# Patient Record
Sex: MALE | Race: ASIAN | NOT HISPANIC OR LATINO | URBAN - METROPOLITAN AREA
[De-identification: names, ages, dates, MRNs, and addresses within clinical notes are randomized per-mention and may not be internally consistent; named-entity substitution may affect disease eponyms.]

---

## 2022-03-05 ENCOUNTER — EMERGENCY (EMERGENCY)
Facility: HOSPITAL | Age: 22
LOS: 1 days | Discharge: HOME | End: 2022-03-05
Admitting: EMERGENCY MEDICINE
Payer: COMMERCIAL

## 2022-03-05 VITALS
OXYGEN SATURATION: 98 % | DIASTOLIC BLOOD PRESSURE: 71 MMHG | RESPIRATION RATE: 16 BRPM | SYSTOLIC BLOOD PRESSURE: 112 MMHG | HEART RATE: 85 BPM | TEMPERATURE: 99 F

## 2022-03-05 VITALS
TEMPERATURE: 98 F | HEIGHT: 71 IN | DIASTOLIC BLOOD PRESSURE: 83 MMHG | WEIGHT: 160.06 LBS | OXYGEN SATURATION: 99 % | RESPIRATION RATE: 17 BRPM | SYSTOLIC BLOOD PRESSURE: 143 MMHG | HEART RATE: 122 BPM

## 2022-03-05 PROCEDURE — 99284 EMERGENCY DEPT VISIT MOD MDM: CPT

## 2022-03-05 RX ORDER — CETIRIZINE HYDROCHLORIDE 10 MG/1
1 TABLET ORAL
Qty: 7 | Refills: 0
Start: 2022-03-05 | End: 2022-03-11

## 2022-03-05 RX ORDER — EPINEPHRINE 0.3 MG/.3ML
0.3 INJECTION INTRAMUSCULAR; SUBCUTANEOUS ONCE
Refills: 0 | Status: COMPLETED | OUTPATIENT
Start: 2022-03-05 | End: 2022-03-05

## 2022-03-05 RX ORDER — IPRATROPIUM/ALBUTEROL SULFATE 18-103MCG
3 AEROSOL WITH ADAPTER (GRAM) INHALATION ONCE
Refills: 0 | Status: COMPLETED | OUTPATIENT
Start: 2022-03-05 | End: 2022-03-05

## 2022-03-05 RX ORDER — FAMOTIDINE 10 MG/ML
20 INJECTION INTRAVENOUS ONCE
Refills: 0 | Status: COMPLETED | OUTPATIENT
Start: 2022-03-05 | End: 2022-03-05

## 2022-03-05 RX ORDER — SODIUM CHLORIDE 9 MG/ML
1000 INJECTION INTRAMUSCULAR; INTRAVENOUS; SUBCUTANEOUS ONCE
Refills: 0 | Status: COMPLETED | OUTPATIENT
Start: 2022-03-05 | End: 2022-03-05

## 2022-03-05 RX ORDER — FAMOTIDINE 10 MG/ML
1 INJECTION INTRAVENOUS
Qty: 8 | Refills: 0
Start: 2022-03-05 | End: 2022-03-08

## 2022-03-05 RX ORDER — EPINEPHRINE 0.3 MG/.3ML
0.3 INJECTION INTRAMUSCULAR; SUBCUTANEOUS
Qty: 1 | Refills: 0
Start: 2022-03-05

## 2022-03-05 RX ADMIN — EPINEPHRINE 0.3 MILLIGRAM(S): 0.3 INJECTION INTRAMUSCULAR; SUBCUTANEOUS at 22:08

## 2022-03-05 RX ADMIN — FAMOTIDINE 20 MILLIGRAM(S): 10 INJECTION INTRAVENOUS at 18:25

## 2022-03-05 RX ADMIN — SODIUM CHLORIDE 1000 MILLILITER(S): 9 INJECTION INTRAMUSCULAR; INTRAVENOUS; SUBCUTANEOUS at 18:20

## 2022-03-05 RX ADMIN — Medication 3 MILLILITER(S): at 18:20

## 2022-03-05 RX ADMIN — Medication 125 MILLIGRAM(S): at 18:19

## 2022-03-05 RX ADMIN — EPINEPHRINE 0.3 MILLIGRAM(S): 0.3 INJECTION INTRAMUSCULAR; SUBCUTANEOUS at 18:25

## 2022-03-05 NOTE — ED PROVIDER NOTE - PROGRESS NOTE DETAILS
patient continues to feel well. notes rash is improved and patient has no more shortness of breath. will continue to monitor closely. patient placed directly in front of provider station. rechecked patient. mentating normally. reports symptomatic improvement. rash to forearm decreased. facial erythema mildly improved.

## 2022-03-05 NOTE — ED PROVIDER NOTE - PATIENT PORTAL LINK FT
You can access the FollowMyHealth Patient Portal offered by [facility] by registering at the following website: [email or web address]. By joining [employer]’s FollowMyHealth portal, you will also be able to view your health information using other applications (apps) compatible with our system.

## 2022-03-05 NOTE — ED PROVIDER NOTE - ENMT, MLM
+Mild swelling of the lips. No swelling of the tongue, no hard or soft palette. +Mild swelling of the lips. hives to the face. No swelling of the tongue, no hard or soft palette abnormalities

## 2022-03-05 NOTE — ED PROVIDER NOTE - CLINICAL SUMMARY MEDICAL DECISION MAKING FREE TEXT BOX
22 y/o male generally healthy male with PMHx of nut allergy presents to the ED after ingestion of food product containing peanut oil. Concern for anaphylaxis. Will give epi-pen, Pepcid, solumedrol and 25 mg benadryl IV, IV fluids and Duoneb. Will monitor for symptomatic improvement. Patient seen immediately on arrival secondary to acuity; meds given immediately.

## 2022-03-05 NOTE — ED PROVIDER NOTE - CONSTITUTIONAL, MLM
Initially in respiratory distress; alleviated after emergency medical intervention in ED. awake, alert, oriented to person, place, time/situation. normal...

## 2022-03-05 NOTE — ED PROVIDER NOTE - RESPIRATORY, MLM
Initially in respiratory distress. Breathing comfortably with no wheezing or stridor s/p medical intervention

## 2022-03-05 NOTE — ED PROVIDER NOTE - OBJECTIVE STATEMENT
22 y/o male with PMHx of allergic reaction to nuts presents with allergic reaction after eating something made with peanut oil. He reports SOB, swelling of the lips, rash diffuse across the trunk and extremities. Patient took 50 mg benadryl prior to arrival without relief. He states he was given an epipen and is "supposed to use it" but does not carry it with him and has never used one. Patient denies fever, chills, cough, chest pain, dizziness, nausea or vomiting.

## 2022-03-05 NOTE — ED PROVIDER NOTE - NSFOLLOWUPINSTRUCTIONS_ED_ALL_ED_FT
General Allergic Reaction    WHAT YOU NEED TO KNOW:    An allergic reaction is your body's response to an allergen. Allergens include medicines, food, insect stings, animal dander, mold, latex, chemicals, and dust mites. Pollen from trees, grass, and weeds can also cause an allergic reaction. An allergic reaction can range from mild to severe.    DISCHARGE INSTRUCTIONS:    Call 911 for signs or symptoms of anaphylaxis, such as trouble breathing, swelling in your mouth or throat, or wheezing. You may also have itching, a rash, hives, or feel like you are going to faint.    Return to the emergency department if:     You have a skin rash, hives, swelling, or itching that is starting to get worse.      Your throat tightens, or your lips or tongue swell.      You have trouble swallowing or speaking.      You have worsening nausea, diarrhea, or abdominal cramps, or you are vomiting.      You have chest pain or tightness.    Contact your healthcare provider if:     You have questions or concerns about your condition or care.        Medicines: You may need any of the following:     Medicines may be given to relieve certain allergy symptoms such as itching, sneezing, and swelling. You may take them as a pill or use drops in your nose or eyes. Topical treatments may be given to put directly on your skin to help decrease itching or swelling.      Epinephrine may be prescribed if you are at risk for anaphylaxis. This is a severe allergic reaction that can be life-threatening. Your healthcare provider will tell you if you need to keep epinephrine with you. You will be taught when and how to use it.      Take your medicine as directed. Contact your healthcare provider if you think your medicine is not helping or if you have side effects. Tell him of her if you are allergic to any medicine. Keep a list of the medicines, vitamins, and herbs you take. Include the amounts, and when and why you take them. Bring the list or the pill bottles to follow-up visits. Carry your medicine list with you in case of an emergency.    Follow up with your healthcare provider as directed: Write down your questions so you remember to ask them during your visits.     Manage your symptoms:     Avoid allergens. You may need to have allergy testing with your healthcare provider or a specialist to find your allergens.      Use cold compresses on your skin or eyes. This will help soothe skin or eyes affected by the allergic reaction. You can make a cold compress by soaking a washcloth in cool water. Wring out the extra water before you apply the washcloth.      Rinse your nasal passages with a saline solution. Daily rinsing may help clear allergens out of your nose. Use distilled water if possible. You can also boil tap water and then let it cool before you use it. Do not use tap water without boiling it first.      Do not smoke. Nicotine and other chemicals in cigarettes and cigars can make an allergic reaction worse, and can also cause lung damage. Ask your healthcare provider for information if you currently smoke and need help to quit. E-cigarettes or smokeless tobacco still contain nicotine. Talk to your healthcare provider before you use these products.    MEDICATIONS  (ZYRTEC AND BENADRYL ARE AVAILABLE OVER THE COUNTER)  1.  TAKE ZYRTEC (CETIRIZINE) 10MG TWICE DAILY FOR 1 WEEK.  2.  TAKE BENADRYL (DIPHENHYDRAMINE) 50MG EVERY 6 HOURS AS NEEDED FOR ITCHING   3.  TAKE STEROIDS 2 TABS EACH DAY FOR 4 DAYS  4.  DO NOT USE SOAP OR SHAMPOO ON YOUR SKIN.  5.  USE ONLY A MILD CLEANSER (CERAVE, EUCERIN) ON YOUR FACE AND ANY AFFECTED AREAS  6. DO NOT APPLY ANY LOTION OR CREAM TO THE RASH OTHER THAN THE STEROID.  7.  YOU CAN USE AQUAPHOR OR VASELINE OINTMENT FOR SKIN HYDRATION IF YOU WISH TO.

## 2022-03-05 NOTE — ED ADULT NURSE REASSESSMENT NOTE - NS ED NURSE REASSESS COMMENT FT1
pt care handed over from shanon marie, checked in on patient, sitting upright smiling and joking with , who states pt looks "much better", pt feels much better, vital signs as charted , awaiting a further 1 more hr until dc, pt aware, gcs 15

## 2022-03-05 NOTE — ED PROVIDER NOTE - EYES, MLM
Clear bilaterally, pupils equal, round Clear bilaterally, pupils equal, round. mild swelling of the eyelids bilat.

## 2022-03-05 NOTE — ED ADULT TRIAGE NOTE - CHIEF COMPLAINT QUOTE
difficulty breathing, throat/tongue swelling, hives and redness, suspects he consumed contaminated food (known nut allergy)

## 2022-03-05 NOTE — ED ADULT NURSE NOTE - OBJECTIVE STATEMENT
difficulty breathing, throat/tongue swelling, hives and redness to trunk and bilat arms, peanut allergy.

## 2022-03-08 DIAGNOSIS — Z91.010 ALLERGY TO PEANUTS: ICD-10-CM
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
## 2022-03-08 DIAGNOSIS — T78.1XXA OTHER ADVERSE FOOD REACTIONS, NOT ELSEWHERE CLASSIFIED, INITIAL ENCOUNTER: ICD-10-CM
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
## 2023-11-30 NOTE — ED PROVIDER NOTE - PROGRESS NOTE
Instructions: This plan will send the code FBSD to the PM system.  DO NOT or CHANGE the price. Detail Level: Simple Price (Do Not Change): 0.00 Improved.

## 2025-06-10 NOTE — ED ADULT TRIAGE NOTE - TEMPERATURE IN CELSIUS (DEGREES C)
HR=80 bpm, NIBP=83/63 mmhg, SpO2=99.0 %, Resp=16 B/min, EtCO2=36 mmHg, Apnea=0 Seconds, Pain=0, Helen=10, Margarita=2 36.8